# Patient Record
Sex: MALE | Race: WHITE | URBAN - METROPOLITAN AREA
[De-identification: names, ages, dates, MRNs, and addresses within clinical notes are randomized per-mention and may not be internally consistent; named-entity substitution may affect disease eponyms.]

---

## 2017-03-16 ENCOUNTER — OFFICE VISIT - RIVER FALLS (OUTPATIENT)
Dept: FAMILY MEDICINE | Facility: CLINIC | Age: 72
End: 2017-03-16

## 2017-03-16 ASSESSMENT — MIFFLIN-ST. JEOR: SCORE: 1393.77

## 2018-01-23 ENCOUNTER — OFFICE VISIT - RIVER FALLS (OUTPATIENT)
Dept: FAMILY MEDICINE | Facility: CLINIC | Age: 73
End: 2018-01-23

## 2018-01-23 ASSESSMENT — MIFFLIN-ST. JEOR: SCORE: 1343.87

## 2018-02-27 ENCOUNTER — OFFICE VISIT - RIVER FALLS (OUTPATIENT)
Dept: FAMILY MEDICINE | Facility: CLINIC | Age: 73
End: 2018-02-27

## 2018-02-27 ASSESSMENT — MIFFLIN-ST. JEOR: SCORE: 1357.48

## 2018-07-26 ENCOUNTER — OFFICE VISIT - RIVER FALLS (OUTPATIENT)
Dept: FAMILY MEDICINE | Facility: CLINIC | Age: 73
End: 2018-07-26

## 2018-07-26 ASSESSMENT — MIFFLIN-ST. JEOR: SCORE: 1348.41

## 2019-07-30 ENCOUNTER — OFFICE VISIT - RIVER FALLS (OUTPATIENT)
Dept: FAMILY MEDICINE | Facility: CLINIC | Age: 74
End: 2019-07-30

## 2022-02-11 VITALS
HEART RATE: 59 BPM | BODY MASS INDEX: 25.66 KG/M2 | SYSTOLIC BLOOD PRESSURE: 102 MMHG | HEART RATE: 62 BPM | SYSTOLIC BLOOD PRESSURE: 108 MMHG | WEIGHT: 151 LBS | DIASTOLIC BLOOD PRESSURE: 60 MMHG | WEIGHT: 154 LBS | OXYGEN SATURATION: 97 % | DIASTOLIC BLOOD PRESSURE: 60 MMHG | HEIGHT: 65 IN | HEIGHT: 65 IN | BODY MASS INDEX: 25.16 KG/M2 | OXYGEN SATURATION: 97 %

## 2022-02-11 VITALS
DIASTOLIC BLOOD PRESSURE: 71 MMHG | BODY MASS INDEX: 25.33 KG/M2 | HEIGHT: 65 IN | HEART RATE: 55 BPM | WEIGHT: 152 LBS | SYSTOLIC BLOOD PRESSURE: 122 MMHG

## 2022-02-11 VITALS
BODY MASS INDEX: 26.99 KG/M2 | HEART RATE: 64 BPM | WEIGHT: 162 LBS | DIASTOLIC BLOOD PRESSURE: 70 MMHG | HEIGHT: 65 IN | OXYGEN SATURATION: 96 % | SYSTOLIC BLOOD PRESSURE: 116 MMHG

## 2022-02-11 VITALS
HEART RATE: 58 BPM | SYSTOLIC BLOOD PRESSURE: 122 MMHG | BODY MASS INDEX: 26.67 KG/M2 | WEIGHT: 157.8 LBS | DIASTOLIC BLOOD PRESSURE: 64 MMHG

## 2022-02-16 NOTE — NURSING NOTE
Comprehensive Intake Entered On:  7/30/2019 10:39 AM CDT    Performed On:  7/30/2019 10:33 AM CDT by Suzie Tapia RN               Summary   Chief Complaint :   Patient is here for cardio follow up.    Weight Measured :   157.8 lb(Converted to: 157 lb 13 oz, 71.58 kg)    Systolic Blood Pressure :   122 mmHg   Diastolic Blood Pressure :   64 mmHg   Mean Arterial Pressure :   83 mmHg   Peripheral Pulse Rate :   58 bpm (LOW)    BP Site :   Right arm   Pulse Site :   Radial artery   BP Method :   Manual   HR Method :   Manual   Suzie Tapia RN - 7/30/2019 10:33 AM CDT   Health Status   Allergies Verified? :   Yes   Medication History Verified? :   Yes   Pre-Visit Planning Status :   Completed   Szuie Tapia RN - 7/30/2019 10:33 AM CDT   Meds / Allergies   (As Of: 7/30/2019 10:39:10 AM CDT)   Allergies (Active)   Brilinta  Estimated Onset Date:   Unspecified ; Reactions:   Sleep apnea ; Created By:   Suzie Tapia RN; Reaction Status:   Active ; Category:   Drug ; Substance:   Brilinta ; Type:   Allergy ; Updated By:   Suzie Tapia RN; Reviewed Date:   7/30/2019 10:36 AM CDT      Lipitor  Estimated Onset Date:   Unspecified ; Reactions:   Myalgia ; Created By:   Estrella Braga; Reaction Status:   Active ; Category:   Drug ; Substance:   Lipitor ; Type:   Allergy ; Updated By:   Estrella Braga; Reviewed Date:   7/30/2019 10:36 AM CDT      lisinopril  Estimated Onset Date:   Unspecified ; Reactions:   Coughing ; Created By:   Erendira Lee MA; Reaction Status:   Active ; Category:   Drug ; Substance:   lisinopril ; Type:   Allergy ; Updated By:   Erendira eLe MA; Reviewed Date:   7/30/2019 10:36 AM CDT      pravastatin  Estimated Onset Date:   Unspecified ; Reactions:   Nausea ; Created By:   Estrella Braga; Reaction Status:   Active ; Category:   Drug ; Substance:   pravastatin ; Type:   Allergy ; Updated By:   Estrella Braga; Reviewed Date:   7/30/2019 10:36 AM CDT        Medication List   (As Of: 7/30/2019  10:39:10 AM CDT)   Prescription/Discharge Order    Miscellaneous Rx Supply  :   Miscellaneous Rx Supply ; Status:   Prescribed ; Ordered As Mnemonic:   CPAP 10 cm water pressure ; Simple Display Line:   See Instructions, Use every night, all night., 1 EA ; Ordering Provider:   Julio Zapata MD; Catalog Code:   Miscellaneous Rx Supply ; Order Dt/Tm:   7/18/2014 9:59:35 AM          aspirin  :   aspirin ; Status:   Prescribed ; Ordered As Mnemonic:   aspirin 81 mg oral tablet ; Simple Display Line:   81 mg, 1 tab(s), PO, Daily, 30 tab(s) ; Ordering Provider:   Julio Zapata MD; Catalog Code:   aspirin ; Order Dt/Tm:   7/18/2014 9:51:10 AM            Home Meds    simvastatin  :   simvastatin ; Status:   Documented ; Ordered As Mnemonic:   simvastatin 10 mg oral tablet ; Simple Display Line:   10 mg, 1 tab(s), po, hs ; Catalog Code:   simvastatin ; Order Dt/Tm:   2/17/2015 2:59:56 PM          omega-3 polyunsaturated fatty acids  :   omega-3 polyunsaturated fatty acids ; Status:   Documented ; Ordered As Mnemonic:   Fish Oil ; Simple Display Line:   po, daily, 4000-4800mg daily ; Catalog Code:   omega-3 polyunsaturated fatty acids ; Order Dt/Tm:   10/16/2014 9:45:40 AM          nitroglycerin  :   nitroglycerin ; Status:   Documented ; Ordered As Mnemonic:   nitroglycerin ; Simple Display Line:   See Instructions, Use PRN ; Catalog Code:   nitroglycerin ; Order Dt/Tm:   5/8/2014 10:51:33 AM          metoprolol  :   metoprolol ; Status:   Documented ; Ordered As Mnemonic:   metoprolol succinate 50 mg oral tablet, extended release ; Simple Display Line:   See Instructions, 3/16/17 decrease metoprolol to 50 mg bid per Dr Burgos, 0 Refill(s) ; Catalog Code:   metoprolol ; Order Dt/Tm:   10/16/2014 9:42:37 AM          methylcellulose  :   methylcellulose ; Status:   Documented ; Ordered As Mnemonic:   Citrucel 500 mg oral tablet ; Simple Display Line:   500 mg, 1 tab(s), po, hs ; Catalog Code:   methylcellulose ; Order  Dt/Tm:   10/16/2014 9:44:57 AM          ezetimibe  :   ezetimibe ; Status:   Documented ; Ordered As Mnemonic:   Zetia 10 mg oral tablet ; Simple Display Line:   10 mg, 1 tab(s), Oral, daily, 0 Refill(s) ; Catalog Code:   ezetimibe ; Order Dt/Tm:   7/31/2018 4:47:24 PM ; Comment:   Continue for now per Cardio-Dr. Burgos 7/26/18          cholecalciferol  :   cholecalciferol ; Status:   Documented ; Ordered As Mnemonic:   Vitamin D3 ; Simple Display Line:   daily ; Catalog Code:   cholecalciferol ; Order Dt/Tm:   10/16/2014 9:46:25 AM

## 2024-07-15 DIAGNOSIS — K59.00 CONSTIPATION, UNSPECIFIED CONSTIPATION TYPE: Primary | ICD-10-CM

## 2024-07-17 RX ORDER — SENNOSIDES 8.6 MG
TABLET ORAL
Qty: 60 TABLET | Refills: 3 | Status: SHIPPED | OUTPATIENT
Start: 2024-07-17

## 2024-07-22 RX ORDER — METOPROLOL SUCCINATE 25 MG/1
TABLET, EXTENDED RELEASE ORAL
Qty: 90 TABLET | Refills: 11 | OUTPATIENT
Start: 2024-07-22